# Patient Record
Sex: FEMALE | Race: WHITE
[De-identification: names, ages, dates, MRNs, and addresses within clinical notes are randomized per-mention and may not be internally consistent; named-entity substitution may affect disease eponyms.]

---

## 2019-08-06 ENCOUNTER — HOSPITAL ENCOUNTER (EMERGENCY)
Dept: HOSPITAL 97 - ER | Age: 54
Discharge: HOME | End: 2019-08-06
Payer: COMMERCIAL

## 2019-08-06 VITALS — TEMPERATURE: 98 F | OXYGEN SATURATION: 100 % | SYSTOLIC BLOOD PRESSURE: 152 MMHG | DIASTOLIC BLOOD PRESSURE: 65 MMHG

## 2019-08-06 DIAGNOSIS — S16.1XXA: Primary | ICD-10-CM

## 2019-08-06 DIAGNOSIS — M25.561: ICD-10-CM
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
## 2019-08-06 DIAGNOSIS — F41.9: ICD-10-CM

## 2019-08-06 PROCEDURE — 99284 EMERGENCY DEPT VISIT MOD MDM: CPT

## 2019-08-06 PROCEDURE — 70450 CT HEAD/BRAIN W/O DYE: CPT

## 2019-08-06 PROCEDURE — 72125 CT NECK SPINE W/O DYE: CPT
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
## 2019-08-06 NOTE — RAD REPORT
EXAM DESCRIPTION:  CT - [id number] - 8/6/2019 7:19 pm

 

COMPARISON:  None.

 

TECHNIQUE:  Axial 5 mm thick images of the head were obtained.  Axial 2 mm thick images of the cervic
al spine were obtained with sagittal and coronal reconstruction images generated and reviewed.

 

All CT scans are performed using dose optimization technique as appropriate and may include automated
 exposure control or mA/KV adjustment according to patient size.

 

FINDINGS:  No intracranial hemorrhage, mass, edema or acute intracranial finding. No suspicion for ac
[place] infarction. No extra-axial fluid collections. Mastoid air cells and paranasal sinuses are clear. 
No globe or orbit abnormality seen. No skull fracture.

 

Cervical body height and alignment are normal. Mild disc space narrowing at C5-6. No fracture or acut
e bony abnormality. Central canal detail is inherently limited.

 

No paraspinal mass or hematoma.

 

IMPRESSION:  Negative CT head examination for acute or significant finding.

 

Mild cervical spine degenerative change C5-6. No fracture or acute finding. Central canal detail is i
nherently limited.
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
